# Patient Record
Sex: FEMALE | URBAN - METROPOLITAN AREA
[De-identification: names, ages, dates, MRNs, and addresses within clinical notes are randomized per-mention and may not be internally consistent; named-entity substitution may affect disease eponyms.]

---

## 2024-09-04 ENCOUNTER — LAB OUTSIDE AN ENCOUNTER (OUTPATIENT)
Dept: URBAN - METROPOLITAN AREA CLINIC 98 | Facility: CLINIC | Age: 34
End: 2024-09-04

## 2024-09-04 ENCOUNTER — OFFICE VISIT (OUTPATIENT)
Dept: URBAN - METROPOLITAN AREA CLINIC 98 | Facility: CLINIC | Age: 34
End: 2024-09-04
Payer: COMMERCIAL

## 2024-09-04 VITALS
HEART RATE: 99 BPM | SYSTOLIC BLOOD PRESSURE: 115 MMHG | HEIGHT: 63 IN | DIASTOLIC BLOOD PRESSURE: 81 MMHG | WEIGHT: 158.6 LBS | TEMPERATURE: 96.6 F | BODY MASS INDEX: 28.1 KG/M2

## 2024-09-04 DIAGNOSIS — E66.3 OVERWEIGHT (BMI 25.0-29.9): ICD-10-CM

## 2024-09-04 DIAGNOSIS — K58.1 IRRITABLE BOWEL SYNDROME WITH CONSTIPATION: ICD-10-CM

## 2024-09-04 DIAGNOSIS — R14.0 ABDOMINAL BLOATING: ICD-10-CM

## 2024-09-04 DIAGNOSIS — K21.9 CHRONIC GERD: ICD-10-CM

## 2024-09-04 PROCEDURE — 99243 OFF/OP CNSLTJ NEW/EST LOW 30: CPT | Performed by: INTERNAL MEDICINE

## 2024-09-04 PROCEDURE — 99203 OFFICE O/P NEW LOW 30 MIN: CPT | Performed by: INTERNAL MEDICINE

## 2024-09-04 RX ORDER — PANTOPRAZOLE SODIUM 40 MG/1
1 TABLET TABLET, DELAYED RELEASE ORAL ONCE A DAY
Qty: 30 | Refills: 3 | OUTPATIENT
Start: 2024-09-04

## 2024-09-04 NOTE — HPI-TODAY'S VISIT:
Patient referred by Pau Multani for evaluation of abdominal pain, p-prandial dyspepsia. Copy of this consult sent to Dr. Multani. 34 yo pt w p-prandial distention, indigestion, nausea wo emesis, GERD sxs wo dysphagia / odynophagia, wo nocturnal sxs. No anorexia or weight loss. Admits to erratic and irregular timing of her diet. She also reports chronic constipation,, bm's q 2 days, passage of hard, pellet-like stools, w ++ straining at stools wo melenic stools nor hematochezia. Her diet lacks in fiber and keeps a sedentary lifestyle.  She's on no Rx. Denies cardiorespiratory sxs. No other complaints. She has FHx colon polyps in her mother.  Normal CPE a o ago.

## 2024-09-05 LAB
ALBUMIN/GLOBULIN RATIO: 1.5
ALBUMIN: 4.7
ALKALINE PHOSPHATASE: 65
ALT: 45
AST: 28
BILIRUBIN, TOTAL: 0.6
BUN/CREATININE RATIO: (no result)
C-REACTIVE PROTEIN, QUANT: 8.6
CALCIUM: 9.4
CARBON DIOXIDE: 23
CHLORIDE: 103
CHOL/HDLC RATIO: 6
CHOLESTEROL, TOTAL: 198
CREATININE: 0.69
EGFR: 117
FIB 4 INDEX: 0.42
FIB 4 INTERPRETATION: (no result)
GLOBULIN: 3.1
GLUCOSE: 89
HDL CHOLESTEROL: 33
HEMOGLOBIN A1C: 5.7
IMMUNOGLOBULIN A: 488
INTERPRETATION: (no result)
LDL-CHOLESTEROL: 134
NON HDL CHOLESTEROL: 165
PLATELET COUNT: 330
POTASSIUM: 3.9
PROTEIN, TOTAL: 7.8
SODIUM: 137
TISSUE TRANSGLUTAMINASE AB, IGA: <1
TRIGLYCERIDES: 172
TSH W/REFLEX TO FT4: 1.79
UREA NITROGEN (BUN): 13

## 2024-09-06 ENCOUNTER — DASHBOARD ENCOUNTERS (OUTPATIENT)
Age: 34
End: 2024-09-06

## 2024-09-06 PROBLEM — 235595009: Status: ACTIVE | Noted: 2024-09-04

## 2024-09-06 PROBLEM — 440630006: Status: ACTIVE | Noted: 2024-09-04

## 2024-10-01 ENCOUNTER — OFFICE VISIT (OUTPATIENT)
Dept: URBAN - METROPOLITAN AREA CLINIC 97 | Facility: CLINIC | Age: 34
End: 2024-10-01
Payer: COMMERCIAL

## 2024-10-01 DIAGNOSIS — R14.0 ABDOMINAL BLOATING: ICD-10-CM

## 2024-10-01 DIAGNOSIS — K76.0 FATTY LIVER: ICD-10-CM

## 2024-10-01 PROCEDURE — 76705 ECHO EXAM OF ABDOMEN: CPT | Performed by: INTERNAL MEDICINE

## 2024-10-14 ENCOUNTER — OFFICE VISIT (OUTPATIENT)
Dept: URBAN - METROPOLITAN AREA SURGERY CENTER 18 | Facility: SURGERY CENTER | Age: 34
End: 2024-10-14
Payer: COMMERCIAL

## 2024-10-14 ENCOUNTER — CLAIMS CREATED FROM THE CLAIM WINDOW (OUTPATIENT)
Dept: URBAN - METROPOLITAN AREA CLINIC 4 | Facility: CLINIC | Age: 34
End: 2024-10-14
Payer: COMMERCIAL

## 2024-10-14 DIAGNOSIS — K31.89 OTHER DISEASES OF STOMACH AND DUODENUM: ICD-10-CM

## 2024-10-14 DIAGNOSIS — K21.9 ACID REFLUX: ICD-10-CM

## 2024-10-14 DIAGNOSIS — K29.70 GASTRITIS, UNSPECIFIED, WITHOUT BLEEDING: ICD-10-CM

## 2024-10-14 DIAGNOSIS — K21.9 GASTRO-ESOPHAGEAL REFLUX DISEASE WITHOUT ESOPHAGITIS: ICD-10-CM

## 2024-10-14 DIAGNOSIS — K21.9 GERD: ICD-10-CM

## 2024-10-14 DIAGNOSIS — K29.60 ADENOPAPILLOMATOSIS GASTRICA: ICD-10-CM

## 2024-10-14 DIAGNOSIS — K29.70 GASTRITIS: ICD-10-CM

## 2024-10-14 PROCEDURE — 88342 IMHCHEM/IMCYTCHM 1ST ANTB: CPT | Performed by: PATHOLOGY

## 2024-10-14 PROCEDURE — 00731 ANES UPR GI NDSC PX NOS: CPT | Performed by: NURSE ANESTHETIST, CERTIFIED REGISTERED

## 2024-10-14 PROCEDURE — 88305 TISSUE EXAM BY PATHOLOGIST: CPT | Performed by: PATHOLOGY

## 2024-10-14 PROCEDURE — 88312 SPECIAL STAINS GROUP 1: CPT | Performed by: PATHOLOGY

## 2024-10-14 PROCEDURE — 43239 EGD BIOPSY SINGLE/MULTIPLE: CPT | Performed by: INTERNAL MEDICINE

## 2025-03-18 ENCOUNTER — ERX REFILL RESPONSE (OUTPATIENT)
Dept: URBAN - METROPOLITAN AREA CLINIC 98 | Facility: CLINIC | Age: 35
End: 2025-03-18

## 2025-03-18 RX ORDER — PANTOPRAZOLE SODIUM 40 MG/1
1 TABLET TABLET, DELAYED RELEASE ORAL ONCE A DAY
Qty: 30 | Refills: 3 | OUTPATIENT